# Patient Record
Sex: FEMALE
[De-identification: names, ages, dates, MRNs, and addresses within clinical notes are randomized per-mention and may not be internally consistent; named-entity substitution may affect disease eponyms.]

---

## 2019-01-03 ENCOUNTER — HOSPITAL ENCOUNTER (EMERGENCY)
Dept: HOSPITAL 25 - ED | Age: 34
Discharge: HOME | End: 2019-01-03
Payer: COMMERCIAL

## 2019-01-03 VITALS — SYSTOLIC BLOOD PRESSURE: 105 MMHG | DIASTOLIC BLOOD PRESSURE: 73 MMHG

## 2019-01-03 DIAGNOSIS — N39.0: Primary | ICD-10-CM

## 2019-01-03 DIAGNOSIS — R10.9: ICD-10-CM

## 2019-01-03 DIAGNOSIS — R10.84: ICD-10-CM

## 2019-01-03 DIAGNOSIS — R11.0: ICD-10-CM

## 2019-01-03 DIAGNOSIS — R30.0: ICD-10-CM

## 2019-01-03 LAB
ALBUMIN SERPL BCG-MCNC: 3.8 G/DL (ref 3.2–5.2)
ALBUMIN/GLOB SERPL: 1 {RATIO} (ref 1–3)
ALP SERPL-CCNC: 95 U/L (ref 34–104)
ALT SERPL W P-5'-P-CCNC: 9 U/L (ref 7–52)
ANION GAP SERPL CALC-SCNC: 7 MMOL/L (ref 2–11)
AST SERPL-CCNC: 12 U/L (ref 13–39)
BASOPHILS # BLD AUTO: 0 10^3/UL (ref 0–0.2)
BUN SERPL-MCNC: 8 MG/DL (ref 6–24)
BUN/CREAT SERPL: 11.4 (ref 8–20)
CALCIUM SERPL-MCNC: 9.1 MG/DL (ref 8.6–10.3)
CHLORIDE SERPL-SCNC: 102 MMOL/L (ref 101–111)
EOSINOPHIL # BLD AUTO: 0.1 10^3/UL (ref 0–0.6)
GLOBULIN SER CALC-MCNC: 3.8 G/DL (ref 2–4)
GLUCOSE SERPL-MCNC: 109 MG/DL (ref 70–100)
HCG SERPL QL: < 0.6 MIU/ML
HCO3 SERPL-SCNC: 24 MMOL/L (ref 22–32)
HCT VFR BLD AUTO: 36 % (ref 35–47)
HGB BLD-MCNC: 11.9 G/DL (ref 12–16)
LYMPHOCYTES # BLD AUTO: 1.7 10^3/UL (ref 1–4.8)
MCH RBC QN AUTO: 29 PG (ref 27–31)
MCHC RBC AUTO-ENTMCNC: 34 G/DL (ref 31–36)
MCV RBC AUTO: 86 FL (ref 80–97)
MONOCYTES # BLD AUTO: 1 10^3/UL (ref 0–0.8)
NEUTROPHILS # BLD AUTO: 6.8 10^3/UL (ref 1.5–7.7)
NRBC # BLD AUTO: 0 10^3/UL
NRBC BLD QL AUTO: 0
PLATELET # BLD AUTO: 279 10^3/UL (ref 150–450)
POTASSIUM SERPL-SCNC: 3.9 MMOL/L (ref 3.5–5)
PROT SERPL-MCNC: 7.6 G/DL (ref 6.4–8.9)
RBC # BLD AUTO: 4.15 10^6/UL (ref 4–5.4)
RBC UR QL AUTO: (no result)
SODIUM SERPL-SCNC: 133 MMOL/L (ref 135–145)
VIT C UR QL: (no result)
WBC # BLD AUTO: 9.6 10^3/UL (ref 3.5–10.8)
WBC UR QL AUTO: (no result)

## 2019-01-03 PROCEDURE — 36415 COLL VENOUS BLD VENIPUNCTURE: CPT

## 2019-01-03 PROCEDURE — 99282 EMERGENCY DEPT VISIT SF MDM: CPT

## 2019-01-03 PROCEDURE — 76775 US EXAM ABDO BACK WALL LIM: CPT

## 2019-01-03 PROCEDURE — 84702 CHORIONIC GONADOTROPIN TEST: CPT

## 2019-01-03 PROCEDURE — 81015 MICROSCOPIC EXAM OF URINE: CPT

## 2019-01-03 PROCEDURE — 85025 COMPLETE CBC W/AUTO DIFF WBC: CPT

## 2019-01-03 PROCEDURE — 96375 TX/PRO/DX INJ NEW DRUG ADDON: CPT

## 2019-01-03 PROCEDURE — 87040 BLOOD CULTURE FOR BACTERIA: CPT

## 2019-01-03 PROCEDURE — 83605 ASSAY OF LACTIC ACID: CPT

## 2019-01-03 PROCEDURE — 87205 SMEAR GRAM STAIN: CPT

## 2019-01-03 PROCEDURE — 86140 C-REACTIVE PROTEIN: CPT

## 2019-01-03 PROCEDURE — 87186 SC STD MICRODIL/AGAR DIL: CPT

## 2019-01-03 PROCEDURE — 81003 URINALYSIS AUTO W/O SCOPE: CPT

## 2019-01-03 PROCEDURE — 87077 CULTURE AEROBIC IDENTIFY: CPT

## 2019-01-03 PROCEDURE — 96365 THER/PROPH/DIAG IV INF INIT: CPT

## 2019-01-03 PROCEDURE — 83690 ASSAY OF LIPASE: CPT

## 2019-01-03 PROCEDURE — 87086 URINE CULTURE/COLONY COUNT: CPT

## 2019-01-03 PROCEDURE — 80053 COMPREHEN METABOLIC PANEL: CPT

## 2019-01-03 NOTE — ED
GI/ HPI





- HPI Summary


HPI Summary: 


33-year-old female presents with urinary tract infection symptoms for the past 

6 days.  She states she was diagnosed with UTI and placed on Bactrim.  Per the 

records Bactrim is not sensitive.  She states her symptoms persisted.  She 

states that today she developed flank pain.  She admits to subjective fevers 

feeling.  She admits to nausea but no vomiting.  States she has a history of 

migraines and is currently having a migraine.  Denies any history of kidney 

stones.  No history of pyelo.  Has no medical conditions.  Has not tried 

anything for her symptoms.  





- History of Current Complaint


Chief Complaint: EDUrogenitalProblems


Time Seen by Provider: 01/03/19 11:21


Stated Complaint: BACK PAIN/URINARY


Pain Intensity: 10





- Allergy/Home Medications


Allergies/Adverse Reactions: 


 Allergies











Allergy/AdvReac Type Severity Reaction Status Date / Time


 


No Known Allergies Allergy   Verified 08/13/16 07:00














PMH/Surg Hx/FS Hx/Imm Hx


Endocrine/Hematology History: 


   Denies: Hx Diabetes


Cardiovascular History: 


   Denies: Hx Myocardial Infarction


 History: 


   Denies: Hx Kidney Stones, Hx Renal Disease, Other  Problems/Disorders


Infectious Disease History: No


Infectious Disease History: 


   Denies: Traveled Outside the [place] in Last 30 Days





- Family History


Known Family History: Positive: None - reviewed, noncontributory





- Social History


Alcohol Use: None


Hx Substance Use: No


Substance Use Type: Reports: None


Hx Tobacco Use: No


Smoking Status (MU): Never Smoked Tobacco


Have You Smoked in the Last Year: No





Review of Systems


Positive: Chills


Negative: Chest Pain


Negative: Shortness Of Breath


Positive: Abdominal Pain, Nausea.  Negative: Vomiting


Positive: dysuria, flank pain


All Other Systems Reviewed And Are Negative: Yes





Physical Exam


Triage Information Reviewed: Yes


Vital Signs On Initial Exam: 


 Initial Vitals











Temp Pulse Resp BP Pulse Ox


 


 99.9 F   118   20   114/69   96 


 


 01/03/19 10:06  01/03/19 10:06  01/03/19 10:06  01/03/19 10:06  01/03/19 10:06











Vital Signs Reviewed: Yes


Appearance: Positive: Well-Appearing


Skin: Positive: Warm, Dry


Head/Face: Positive: Normal Head/Face Inspection


Eyes: Positive: Normal, Conjunctiva Clear


ENT: Positive: Pharynx normal


Respiratory/Lung Sounds: Positive: Clear to Auscultation, Breath Sounds Present


Cardiovascular: Positive: Normal, RRR


Abdomen Description: Positive: CVA Tenderness (R), CVA Tenderness (L), Other: - 

tenderness suprapubic


Bowel Sounds: Positive: Present


Musculoskeletal: Positive: Normal


Neurological: Positive: Normal


Psychiatric: Positive: Normal





Diagnostics





- Vital Signs


 Vital Signs











  Temp Pulse Resp BP Pulse Ox


 


 01/03/19 12:00  98.9 F  90  16  109/63  98


 


 01/03/19 10:06  99.9 F  118  20  114/69  96














- Laboratory


Lab Results: 


 Lab Results











  01/03/19 01/03/19 01/03/19 Range/Units





  10:43 11:34 11:34 


 


WBC   9.6   (3.5-10.8)  10^3/ul


 


RBC   4.15   (4.00-5.40)  10^6/ul


 


Hgb   11.9 L   (12.0-16.0)  g/dl


 


Hct   36   (35-47)  %


 


MCV   86   (80-97)  fL


 


MCH   29   (27-31)  pg


 


MCHC   34   (31-36)  g/dl


 


RDW   13   (10.5-15)  %


 


Plt Count   279   (150-450)  10^3/ul


 


MPV   7.0 L   (7.4-10.4)  fL


 


Neut % (Auto)   70.7   %


 


Lymph % (Auto)   17.6   %


 


Mono % (Auto)   10.7   %


 


Eos % (Auto)   0.8   %


 


Baso % (Auto)   0.2   %


 


Absolute Neuts (auto)   6.8   (1.5-7.7)  10^3/ul


 


Absolute Lymphs (auto)   1.7   (1.0-4.8)  10^3/ul


 


Absolute Monos (auto)   1.0 H   (0-0.8)  10^3/ul


 


Absolute Eos (auto)   0.1   (0-0.6)  10^3/ul


 


Absolute Basos (auto)   0   (0-0.2)  10^3/ul


 


Absolute Nucleated RBC   0   10^3/ul


 


Nucleated RBC %   0   


 


Sodium    133 L  (135-145)  mmol/L


 


Potassium    3.9  (3.5-5.0)  mmol/L


 


Chloride    102  (101-111)  mmol/L


 


Carbon Dioxide    24  (22-32)  mmol/L


 


Anion Gap    7  (2-11)  mmol/L


 


BUN    8  (6-24)  mg/dL


 


Creatinine    0.70  (0.51-0.95)  mg/dL


 


Est GFR ( Amer)    118.1  (>60)  


 


Est GFR (Non-Af Amer)    97.6  (>60)  


 


BUN/Creatinine Ratio    11.4  (8-20)  


 


Glucose    109 H  ()  mg/dL


 


Lactic Acid     (0.5-2.0)  mmol/L


 


Calcium    9.1  (8.6-10.3)  mg/dL


 


Total Bilirubin    0.50  (0.2-1.0)  mg/dL


 


AST    12 L  (13-39)  U/L


 


ALT    9  (7-52)  U/L


 


Alkaline Phosphatase    95  ()  U/L


 


C-Reactive Protein    55.18 H  (<8.01)  mg/L


 


Total Protein    7.6  (6.4-8.9)  g/dL


 


Albumin    3.8  (3.2-5.2)  g/dL


 


Globulin    3.8  (2-4)  g/dL


 


Albumin/Globulin Ratio    1.0  (1-3)  


 


Lipase    10 L  (11.0-82.0)  U/L


 


Beta HCG, Quant    < 0.60  mIU/mL


 


Urine Color  Yellow    


 


Urine Appearance  Cloudy    


 


Urine pH  5.0    (5-9)  


 


Ur Specific Gravity  1.010    (1.010-1.030)  


 


Urine Protein  Negative    (Negative)  


 


Urine Ketones  Negative    (Negative)  


 


Urine Blood  2+ A    (Negative)  


 


Urine Nitrate  Negative    (Negative)  


 


Urine Bilirubin  Negative    (Negative)  


 


Urine Urobilinogen  Negative    (Negative)  


 


Ur Leukocyte Esterase  2+ A    (Negative)  


 


Urine WBC (Auto)  3+(>20/hpf) A    (Absent)  


 


Urine RBC (Auto)  2+(6-10/hpf) A    (Absent)  


 


Ur Squamous Epith Cells  Present A    (Absent)  


 


Ur Transition Epith Cell  Present A    (Absent)  


 


Urine Bacteria  1+ A    (Absent)  


 


Urine Glucose  Negative    (Negative)  


 


Urine Ascorbic Acid  * A    (Negative)  














  01/03/19 Range/Units





  11:34 


 


WBC   (3.5-10.8)  10^3/ul


 


RBC   (4.00-5.40)  10^6/ul


 


Hgb   (12.0-16.0)  g/dl


 


Hct   (35-47)  %


 


MCV   (80-97)  fL


 


MCH   (27-31)  pg


 


MCHC   (31-36)  g/dl


 


RDW   (10.5-15)  %


 


Plt Count   (150-450)  10^3/ul


 


MPV   (7.4-10.4)  fL


 


Neut % (Auto)   %


 


Lymph % (Auto)   %


 


Mono % (Auto)   %


 


Eos % (Auto)   %


 


Baso % (Auto)   %


 


Absolute Neuts (auto)   (1.5-7.7)  10^3/ul


 


Absolute Lymphs (auto)   (1.0-4.8)  10^3/ul


 


Absolute Monos (auto)   (0-0.8)  10^3/ul


 


Absolute Eos (auto)   (0-0.6)  10^3/ul


 


Absolute Basos (auto)   (0-0.2)  10^3/ul


 


Absolute Nucleated RBC   10^3/ul


 


Nucleated RBC %   


 


Sodium   (135-145)  mmol/L


 


Potassium   (3.5-5.0)  mmol/L


 


Chloride   (101-111)  mmol/L


 


Carbon Dioxide   (22-32)  mmol/L


 


Anion Gap   (2-11)  mmol/L


 


BUN   (6-24)  mg/dL


 


Creatinine   (0.51-0.95)  mg/dL


 


Est GFR ( Amer)   (>60)  


 


Est GFR (Non-Af Amer)   (>60)  


 


BUN/Creatinine Ratio   (8-20)  


 


Glucose   ()  mg/dL


 


Lactic Acid  0.6  (0.5-2.0)  mmol/L


 


Calcium   (8.6-10.3)  mg/dL


 


Total Bilirubin   (0.2-1.0)  mg/dL


 


AST   (13-39)  U/L


 


ALT   (7-52)  U/L


 


Alkaline Phosphatase   ()  U/L


 


C-Reactive Protein   (<8.01)  mg/L


 


Total Protein   (6.4-8.9)  g/dL


 


Albumin   (3.2-5.2)  g/dL


 


Globulin   (2-4)  g/dL


 


Albumin/Globulin Ratio   (1-3)  


 


Lipase   (11.0-82.0)  U/L


 


Beta HCG, Quant   mIU/mL


 


Urine Color   


 


Urine Appearance   


 


Urine pH   (5-9)  


 


Ur Specific Gravity   (1.010-1.030)  


 


Urine Protein   (Negative)  


 


Urine Ketones   (Negative)  


 


Urine Blood   (Negative)  


 


Urine Nitrate   (Negative)  


 


Urine Bilirubin   (Negative)  


 


Urine Urobilinogen   (Negative)  


 


Ur Leukocyte Esterase   (Negative)  


 


Urine WBC (Auto)   (Absent)  


 


Urine RBC (Auto)   (Absent)  


 


Ur Squamous Epith Cells   (Absent)  


 


Ur Transition Epith Cell   (Absent)  


 


Urine Bacteria   (Absent)  


 


Urine Glucose   (Negative)  


 


Urine Ascorbic Acid   (Negative)  











Result Diagrams: 


 01/03/19 11:34





 01/03/19 11:34


Lab Statement: Any lab studies that have been ordered have been reviewed, and 

results considered in the medical decision making process.





- Ultrasound


  ** No standard instances


Ultrasound Interpretation Completed By: Radiologist


Summary of Ultrasound Findings: IMPRESSION: No hydronephrosis of either kidney 

is noted.





Re-Evaluation





- Re-Evaluation


  ** First Eval


Re-Evaluation Time: 13:04


Change: Improved


Comment: headache better





GIGU Course/Dx





- Course


Course Of Treatment: 33-year-old female presents with urinary tract infection 

symptoms for the past 6 days.  She states she was diagnosed with UTI and placed 

on Bactrim.  Per the records Bactrim is not sensitive.  She states her symptoms 

persisted.  She states that today she developed flank pain.  She admits to 

subjective fevers feeling.  She admits to nausea but no vomiting.  States she 

has a history of migraines and is currently having a migraine.  Denies any 

history of kidney stones.  No history of pyelo.  Has no medical conditions.  

Has not tried anything for her symptoms.  On exam tenderness over the flanks.  

tender suprapubically.  Vitals stable.  Renal ultrasound normal.  Lab work 

without significant abnormality except for CRP is elevated.  Urine shows UTI.  

Gave a dose of Rocephin here.  We'll place on Cipro which per old records is 

sensitive to.  Patient has not failed outpatient as was not sensitive to 

antibiotic.  Told if anything changes to return.  Patient understands agrees 

with plan.





- Diagnoses


Differential Diagnoses - Female: Pyelonephritis, Urinary Tract Infection, 

Ureteral Calculi


Provider Diagnoses: 


 UTI (urinary tract infection)








Discharge





- Sign-Out/Discharge


Documenting (check all that apply): Patient Departure





- Discharge Plan


Condition: Good


Disposition: HOME


Prescriptions: 


Ciprofloxacin TAB* [Cipro 500 MG TAB*] 500 mg PO BID #14 tab


Patient Education Materials:  Urinary Tract Infection in Women (ED)


Referrals: 


Ton Morton MD [Primary Care Provider] - 


Additional Instructions: 


Take Cipro twice a day for 7 days


Take tyenlol or ibuprofen for pain every 6 hours


drink plenty of fluids


Follow up with primary within 5 days


Return to ED if develop any new or worsening symptoms





- Billing Disposition and Condition


Condition: GOOD


Disposition: Home

## 2019-01-04 NOTE — PN
Progress Note





- Progress Note


Date of Service: 01/02/19


Note: 


Patient called several times during the morning of 1/4/19


Obtained answering machine which is full, unable to leave a message


Sent letter on 1/4/19 to make aware of blood culture results.


Will continue to try to reach patient

## 2019-01-05 NOTE — PN
Progress Note





- Progress Note


Date of Service: 01/05/19


Note: 





Attempted to try to reach patient again on 1/5/19.


Patient was able to be reached at 8am.


She states she feels improved with no UTI symptoms


She denies fevers, sweats or chills.


She denies any symptoms at this time


She continues to take the ABX, Cipro


Discuss if she develops any fevers, sweats, chills or she develops any illness, 

to return to the ED immediately


She was made aware of the blood culture results


However, since she states she feels improved, she would not like to return to 

the ED at this time


She understands risks and these were repeated back to provider during phone 

call.

## 2019-01-06 NOTE — PN
Progress Note





- Progress Note


Date of Service: 01/03/19


Note: 


Blood cultures grew gram-negative bacilli, E coli


Patient was placed on Cipro prior to discharge


This is sensitive to organism


Patient was called yesterday


Patient stated she feels improved


There is nothing further needed at this time


Patient understands to return if she develops fevers, sweats, chills or other 

generalized weakness or illness

## 2019-10-26 ENCOUNTER — HOSPITAL ENCOUNTER (EMERGENCY)
Dept: HOSPITAL 25 - ED | Age: 34
Discharge: HOME | End: 2019-10-26
Payer: COMMERCIAL

## 2019-10-26 VITALS — SYSTOLIC BLOOD PRESSURE: 119 MMHG | DIASTOLIC BLOOD PRESSURE: 73 MMHG

## 2019-10-26 DIAGNOSIS — R19.7: ICD-10-CM

## 2019-10-26 DIAGNOSIS — R11.10: ICD-10-CM

## 2019-10-26 DIAGNOSIS — R10.9: Primary | ICD-10-CM

## 2019-10-26 LAB
ALBUMIN SERPL BCG-MCNC: 3.9 G/DL (ref 3.2–5.2)
ALBUMIN/GLOB SERPL: 1.2 {RATIO} (ref 1–3)
ALP SERPL-CCNC: 80 U/L (ref 34–104)
ALT SERPL W P-5'-P-CCNC: 21 U/L (ref 7–52)
ANION GAP SERPL CALC-SCNC: 5 MMOL/L (ref 2–11)
AST SERPL-CCNC: 16 U/L (ref 13–39)
BASOPHILS # BLD AUTO: 0 10^3/UL (ref 0–0.2)
BUN SERPL-MCNC: 15 MG/DL (ref 6–24)
BUN/CREAT SERPL: 26.3 (ref 8–20)
CALCIUM SERPL-MCNC: 8.8 MG/DL (ref 8.6–10.3)
CHLORIDE SERPL-SCNC: 106 MMOL/L (ref 101–111)
EOSINOPHIL # BLD AUTO: 0.2 10^3/UL (ref 0–0.6)
GLOBULIN SER CALC-MCNC: 3.2 G/DL (ref 2–4)
GLUCOSE SERPL-MCNC: 94 MG/DL (ref 70–100)
HCG SERPL QL: < 0.6 MIU/ML
HCO3 SERPL-SCNC: 24 MMOL/L (ref 22–32)
HCT VFR BLD AUTO: 39 % (ref 35–47)
HGB BLD-MCNC: 12.9 G/DL (ref 12–16)
LYMPHOCYTES # BLD AUTO: 1.2 10^3/UL (ref 1–4.8)
MAGNESIUM SERPL-MCNC: 2.1 MG/DL (ref 1.9–2.7)
MCH RBC QN AUTO: 29 PG (ref 27–31)
MCHC RBC AUTO-ENTMCNC: 33 G/DL (ref 31–36)
MCV RBC AUTO: 87 FL (ref 80–97)
MONOCYTES # BLD AUTO: 0.3 10^3/UL (ref 0–0.8)
NEUTROPHILS # BLD AUTO: 4.7 10^3/UL (ref 1.5–7.7)
NRBC # BLD AUTO: 0 10^3/UL
NRBC BLD QL AUTO: 0.1
PLATELET # BLD AUTO: 239 10^3/UL (ref 150–450)
POTASSIUM SERPL-SCNC: 4 MMOL/L (ref 3.5–5)
PROT SERPL-MCNC: 7.1 G/DL (ref 6.4–8.9)
RBC # BLD AUTO: 4.46 10^6 /UL (ref 3.7–4.87)
SODIUM SERPL-SCNC: 135 MMOL/L (ref 135–145)
WBC # BLD AUTO: 6.4 10^3/UL (ref 3.5–10.8)

## 2019-10-26 PROCEDURE — 80053 COMPREHEN METABOLIC PANEL: CPT

## 2019-10-26 PROCEDURE — 99282 EMERGENCY DEPT VISIT SF MDM: CPT

## 2019-10-26 PROCEDURE — 36415 COLL VENOUS BLD VENIPUNCTURE: CPT

## 2019-10-26 PROCEDURE — 84702 CHORIONIC GONADOTROPIN TEST: CPT

## 2019-10-26 PROCEDURE — 85025 COMPLETE CBC W/AUTO DIFF WBC: CPT

## 2019-10-26 PROCEDURE — 83735 ASSAY OF MAGNESIUM: CPT

## 2019-10-26 PROCEDURE — 86140 C-REACTIVE PROTEIN: CPT

## 2019-10-26 PROCEDURE — 83690 ASSAY OF LIPASE: CPT

## 2019-10-26 NOTE — ED
GI/ HPI





- HPI Summary


HPI Summary: 


34 year old female presents with abdominal pain for the past 2 days.  States 

that is diffuse.  She also admits to nausea vomiting.  Admits to diarrhea.  Has 

not been able to keep anything down. Son has similar symptoms.  No fevers.  No 

chest pain shortness breath or cough.  Denies any urinary symptoms.  Has no 

medical conditions.  Hasn't tried anything for her symptoms.  She hasn't been 

able to keep anything down.  





- History of Current Complaint


Chief Complaint: EDAbdPain


Time Seen by Provider: 10/26/19 10:53


Stated Complaint: VOMITING AND STOMACH PAIN PER PT


Pain Intensity: 6





- Allergy/Home Medications


Allergies/Adverse Reactions: 


 Allergies











Allergy/AdvReac Type Severity Reaction Status Date / Time


 


No Known Allergies Allergy   Verified 08/13/16 07:00











Home Medications: 


 Home Medications





Norgestimate-Ethinyl Estradiol [Norg-Ee 0.18-0.215-0.25/0.025] 1 tab PO DAILY 10

/26/19 [History Confirmed 10/26/19]











PMH/Surg Hx/FS Hx/Imm Hx


Endocrine/Hematology History: 


   Denies: Hx Diabetes


Cardiovascular History: 


   Denies: Hx Myocardial Infarction


 History: 


   Denies: Hx Kidney Stones, Hx Renal Disease, Other  Problems/Disorders


Infectious Disease History: No


Infectious Disease History: 


   Denies: Traveled Outside the [place] in Last 30 Days





- Family History


Known Family History: Positive: None - reviewed, noncontributory





- Social History


Alcohol Use: None


Hx Substance Use: No


Substance Use Type: Reports: None


Hx Tobacco Use: No


Smoking Status (MU): Never Smoked Tobacco


Have You Smoked in the Last Year: No





Review of Systems


Negative: Fever


Negative: Chest Pain


Negative: Shortness Of Breath


Positive: Abdominal Pain, Vomiting, Diarrhea, Nausea


All Other Systems Reviewed And Are Negative: Yes





Physical Exam


Triage Information Reviewed: Yes


Vital Signs On Initial Exam: 


 Initial Vitals











Temp Pulse Resp BP Pulse Ox


 


 98.6 F   89   17   117/75   97 


 


 10/26/19 10:49  10/26/19 10:49  10/26/19 10:49  10/26/19 10:49  10/26/19 10:49











Vital Signs Reviewed: Yes


Appearance: Positive: Well-Appearing


Skin: Positive: Warm, Dry


Head/Face: Positive: Normal Head/Face Inspection


Eyes: Positive: Normal, Conjunctiva Clear


ENT: Positive: Pharynx normal


Respiratory/Lung Sounds: Positive: Clear to Auscultation, Breath Sounds Present


Cardiovascular: Positive: Normal, RRR


Abdomen Description: Positive: Soft, Other: - diffuse abd tenderness


Bowel Sounds: Positive: Present


Musculoskeletal: Positive: Normal


Neurological: Positive: Normal


Psychiatric: Positive: Normal





Procedures





- Sedation


Patient Received Moderate/Deep Sedation with Procedure: No





Diagnostics





- Vital Signs


 Vital Signs











  Temp Pulse Resp BP Pulse Ox


 


 10/26/19 10:49  98.6 F  89  17  117/75  97














- Laboratory


Result Diagrams: 


 10/26/19 11:13





 10/26/19 11:13


Lab Statement: Any lab studies that have been ordered have been reviewed, and 

results considered in the medical decision making process.





Re-Evaluation





- Re-Evaluation


  ** First Eval


Re-Evaluation Time: 12:21


Change: Improved


Comment: feeling better





GIGU Course/Dx





- Course


Course Of Treatment: 34 year old female presents with abdominal pain for the 

past 2 days.  States that is diffuse.  She also admits to nausea vomiting.  

Admits to diarrhea.  Has not been able to keep anything down. Son has similar 

symptoms.  No fevers.  No chest pain shortness breath or cough.  Denies any 

urinary symptoms.  Has no medical conditions.  Hasn't tried anything for her 

symptoms.  She hasn't been able to keep anything down.  On exam has mild 

diffuse abdominal tenderness. wbc normal. crp slighly elevated. electrolytes 

normal. zofran was given and tolerated soup in ED. patient understand and 

agrees with plan.





- Diagnoses


Differential Diagnoses - Female: Gastroenteritis (Viral), Gastroenteritis (

Bacterial), Urinary Tract Infection


Provider Diagnoses: 


 Abdominal pain, vomiting, and diarrhea








Discharge ED





- Sign-Out/Discharge


Documenting (check all that apply): Patient Departure





- Discharge Plan


Condition: Good


Disposition: HOME


Prescriptions: 


Ondansetron TAB* [Zofran 4 MG Tab*] 4 mg PO Q6H PRN #12 tab


 PRN Reason: Nausea


Patient Education Materials:  Acute Nausea and Vomiting (ED)


Referrals: 


Ton Morton MD [Primary Care Provider] - 


Additional Instructions: 


Can take Zofran every 6 hours as needed for nausea


Drink small amounts of fluid as tolerated


When able to eat follow BRAT diet: Bananas, rice, applesauce, toast


Take ibuprofen or Tylenol for pain as needed every 6 hours


Follow up with primary within 5 days


Return to ED if develop any new or worsening symptoms





- Billing Disposition and Condition


Condition: GOOD


Disposition: Home